# Patient Record
Sex: FEMALE | Race: WHITE | URBAN - NONMETROPOLITAN AREA
[De-identification: names, ages, dates, MRNs, and addresses within clinical notes are randomized per-mention and may not be internally consistent; named-entity substitution may affect disease eponyms.]

---

## 2020-09-24 ENCOUNTER — TELEPHONE (OUTPATIENT)
Dept: OBGYN CLINIC | Age: 55
End: 2020-09-24

## 2020-11-17 ENCOUNTER — HOSPITAL ENCOUNTER (OUTPATIENT)
Age: 55
Setting detail: SPECIMEN
Discharge: HOME | End: 2020-11-17
Payer: COMMERCIAL

## 2020-11-17 ENCOUNTER — OFFICE VISIT (OUTPATIENT)
Dept: OBGYN CLINIC | Age: 55
End: 2020-11-17
Payer: COMMERCIAL

## 2020-11-17 VITALS
SYSTOLIC BLOOD PRESSURE: 133 MMHG | BODY MASS INDEX: 42.35 KG/M2 | WEIGHT: 269.8 LBS | HEIGHT: 67 IN | DIASTOLIC BLOOD PRESSURE: 73 MMHG

## 2020-11-17 PROCEDURE — 99396 PREV VISIT EST AGE 40-64: CPT | Performed by: NURSE PRACTITIONER

## 2020-11-17 RX ORDER — GLIPIZIDE 5 MG/1
5 TABLET ORAL
COMMUNITY

## 2020-11-17 RX ORDER — LOVASTATIN 20 MG/1
TABLET ORAL
COMMUNITY
Start: 2020-09-12

## 2020-11-17 RX ORDER — ENALAPRIL MALEATE 2.5 MG/1
TABLET ORAL
COMMUNITY
Start: 2020-09-12

## 2020-11-17 RX ORDER — ERTUGLIFLOZIN 5 MG/1
TABLET, FILM COATED ORAL
COMMUNITY
Start: 2020-09-14

## 2020-11-17 RX ORDER — FLUCONAZOLE 150 MG/1
150 TABLET ORAL ONCE
Qty: 2 TABLET | Refills: 0 | Status: SHIPPED | OUTPATIENT
Start: 2020-11-17 | End: 2020-11-17

## 2020-11-17 RX ORDER — INSULIN GLARGINE AND LIXISENATIDE 100; 33 U/ML; UG/ML
INJECTION, SOLUTION SUBCUTANEOUS
COMMUNITY
Start: 2020-09-15

## 2020-11-17 SDOH — HEALTH STABILITY: MENTAL HEALTH: HOW OFTEN DO YOU HAVE A DRINK CONTAINING ALCOHOL?: MONTHLY OR LESS

## 2020-11-17 NOTE — PROGRESS NOTES
10/2019    Last HPV: 10/2019    Last Mammogram: 2017    Last Dexascan n/a    Last colorectal screen- never had     Do you do self breast exams: encouraged monthly SBE     Past Medical History:   Diagnosis Date    Abnormal Pap smear of cervix 2016    Diabetes mellitus ([facility] [address]     H/O tinnitus     Hearing loss     High cholesterol        Past Surgical History:   Procedure Laterality Date    TONSILLECTOMY      TUBAL LIGATION      TYMPANOSTOMY TUBE PLACEMENT         Family History   Problem Relation Age of Onset    Heart Attack Maternal Grandfather     Lung Cancer Father     Hypertension Father     Cancer Mother         lymphoma    Stroke Brother        Chief Complaint   Patient presents with    Gynecologic Exam     NL pap 10/2019. Refill Myrbetriq. Voices no concerns. PE:  Vital Signs  Blood pressure 133/73, height 5' 7\" (1.702 m), weight 269 lb 12.8 oz (122.4 kg). Estimated body mass index is 42.26 kg/m² as calculated from the following:    Height as of this encounter: 5' 7\" (1.702 m). Weight as of this encounter: 269 lb 12.8 oz (122.4 kg). HPI: Patient here for annual exam. Waqas Triana well, voices no concerns. Negative pap in 2019 per [facility]. Due for mammogram. Wellness labs UTD. Review of Systems   Genitourinary: Positive for frequency. All other systems reviewed and are negative. Objective  Heent:   negative   Cor: regular rate and rhythm, no murmurs              Pul:clear to auscultation bilaterally- no wheezes, rales or rhonchi, normal air movement, no respiratory distress      GI: Abdomen soft, non-tender.  BS normal. No masses,  No organomegaly           Extremities: normal strength, tone, and muscle mass   Breasts: Breast:normal appearance, no masses or tenderness, No nipple retraction or dimpling, No nipple discharge or bleeding   Pelvic Exam: GENITAL/URINARY:  External Genitalia:  General appearance; erythematous, white discharge between labia, small amount of break

## 2020-11-18 LAB
DIRECT EXAM: NORMAL
Lab: NORMAL
SPECIMEN DESCRIPTION: NORMAL

## 2021-01-13 ENCOUNTER — TELEPHONE (OUTPATIENT)
Dept: OBGYN CLINIC | Age: 56
End: 2021-01-13

## 2021-01-13 NOTE — TELEPHONE ENCOUNTER
Pt called stating she went to refill her Myrbetriq and it was over $400 a month. Pt wondering if there is a coupons for it or if there was something else that was cheaper she could try. Please advise.

## 2021-01-14 RX ORDER — OXYBUTYNIN CHLORIDE 10 MG/1
10 TABLET, EXTENDED RELEASE ORAL DAILY
Qty: 30 TABLET | Refills: 3 | Status: SHIPPED | OUTPATIENT
Start: 2021-01-14 | End: 2021-05-18

## 2021-01-14 NOTE — TELEPHONE ENCOUNTER
Pt called and stated that in the past her script cost $80 a month and is not sure why it has gone up. Pt is willing to try new medication.

## 2021-05-17 ENCOUNTER — TELEPHONE (OUTPATIENT)
Dept: OBGYN CLINIC | Age: 56
End: 2021-05-17

## 2021-05-17 NOTE — TELEPHONE ENCOUNTER
Pt called requesting a refill of oxybutynin. Pt requesting to increase dose to 20 mg sent to [facility].

## 2021-05-18 RX ORDER — OXYBUTYNIN CHLORIDE 15 MG/1
15 TABLET, EXTENDED RELEASE ORAL DAILY
Qty: 30 TABLET | Refills: 6 | Status: SHIPPED | OUTPATIENT
Start: 2021-05-18

## 2021-05-24 RX ORDER — OXYBUTYNIN CHLORIDE 10 MG/1
TABLET, EXTENDED RELEASE ORAL
Qty: 30 TABLET | OUTPATIENT
Start: 2021-05-24

## 2021-11-04 ENCOUNTER — TELEPHONE (OUTPATIENT)
Dept: OBGYN CLINIC | Age: 56
End: 2021-11-04

## 2021-11-04 NOTE — TELEPHONE ENCOUNTER
Patient called stating that she has an appointment coming up later this month and wanted to confirm what was being done. Explained that her pap in 2019 had neg HPV, so should just be breast and pelvic. Patient was at the urology office and mentioned that she was still having urinary incontinence. She was previously on Myrbetriq and it became very expensive, so switched to oxybutynin. She had an accident at work last week where she lost full bladder control and had to go home to change. She was told by the urologist that she needed to follow up with our office since we are the ones prescribing the medication. Patient would like to discuss medication options at her upcoming appointment.

## 2021-11-17 ENCOUNTER — OFFICE VISIT (OUTPATIENT)
Dept: OBGYN CLINIC | Age: 56
End: 2021-11-17
Payer: COMMERCIAL

## 2021-11-17 VITALS — BODY MASS INDEX: 41.18 KG/M2 | DIASTOLIC BLOOD PRESSURE: 78 MMHG | SYSTOLIC BLOOD PRESSURE: 130 MMHG | WEIGHT: 262.9 LBS

## 2021-11-17 DIAGNOSIS — Z01.419 WOMEN'S ANNUAL ROUTINE GYNECOLOGICAL EXAMINATION: Primary | ICD-10-CM

## 2021-11-17 DIAGNOSIS — N32.81 OAB (OVERACTIVE BLADDER): ICD-10-CM

## 2021-11-17 PROCEDURE — 99396 PREV VISIT EST AGE 40-64: CPT | Performed by: NURSE PRACTITIONER

## 2021-11-17 RX ORDER — FLASH GLUCOSE SENSOR
KIT MISCELLANEOUS
COMMUNITY
Start: 2021-11-05

## 2021-11-17 RX ORDER — DARIFENACIN HYDROBROMIDE 15 MG/1
15 TABLET, EXTENDED RELEASE ORAL DAILY
Qty: 30 TABLET | Refills: 3 | Status: SHIPPED | OUTPATIENT
Start: 2021-11-17 | End: 2022-07-14

## 2021-11-17 RX ORDER — ASPIRIN 81 MG/1
81 TABLET, CHEWABLE ORAL DAILY
COMMUNITY

## 2021-11-17 NOTE — PROGRESS NOTES
STEGLATRO 5 MG TABS, , Disp: , Rfl:     enalapril (VASOTEC) 2.5 MG tablet, , Disp: , Rfl:     glipiZIDE (GLUCOTROL) 5 MG tablet, Take 5 mg by mouth 2 times daily (before meals), Disp: , Rfl:     Social History     Substance and Sexual Activity   Sexual Activity Yes    Partners: Male       Any bleeding or pain with intercourse: No    Last Yearly:  11/2020    Last pap: 10/2019    Last HPV: 10/2019    Last Mammogram: n/a    Do you do self breast exams: encouraged monthly     Past Medical History:   Diagnosis Date    Abnormal Pap smear of cervix 2016    Diabetes mellitus ([facility] [address]     H/O tinnitus     Hearing loss     High cholesterol        Past Surgical History:   Procedure Laterality Date    TONSILLECTOMY      TUBAL LIGATION      TYMPANOSTOMY TUBE PLACEMENT         Family History   Problem Relation Age of Onset    Heart Attack Maternal Grandfather     Lung Cancer Father     Hypertension Father     Cancer Mother         lymphoma    Stroke Brother        Chief Complaint   Patient presents with    Gynecologic Exam     Last pap 10/9/19. Reports oxybutynin not helping, reports sx the same- maybe slightly worse. Had 1 accident at work where she lost full bladder control. Mammogram due. PE:  Vital Signs  Blood pressure 130/78, weight 262 lb 14.4 oz (119.3 kg). Estimated body mass index is 41.18 kg/m² as calculated from the following:    Height as of 11/17/20: 5' 7\" (1.702 m). Weight as of this encounter: 262 lb 14.4 oz (119.3 kg). HPI: Patient presents today for annual exam. Feeling well, voices no concerns. Denies breast/pelvic pain. Negative pap 10/2019. Mammogram due. Wellness reviewed; reports hgb a1c well controlled. Denies PMB. Reports Ditropan is not controlling OAB symptoms. States she gets the urge to void and is unable to hold urine if she does not go straight to the bathroom. Review of Systems   Genitourinary: Positive for frequency.  Negative for difficulty urinating, dysuria, pelvic pain, vaginal bleeding and vaginal discharge. Overactive bladder     All other systems reviewed and are negative. Physical Exam  Constitutional:       General: She is not in acute distress. Appearance: Normal appearance. She is not ill-appearing. Genitourinary:      Vulva normal.      No vaginal discharge. Right Adnexa: not tender. Left Adnexa: not tender. Adnexa exam comments: Difficult to assess due to body habitus. Uterus is not tender. Breasts:      Right: No mass, nipple discharge, skin change or tenderness. Left: No mass, nipple discharge, skin change or tenderness. HENT:      Head: Normocephalic. Cardiovascular:      Rate and Rhythm: Normal rate and regular rhythm. Pulmonary:      Effort: Pulmonary effort is normal.      Breath sounds: Normal breath sounds. Abdominal:      Palpations: Abdomen is soft. Tenderness: There is no abdominal tenderness. There is no guarding or rebound. Musculoskeletal:         General: Normal range of motion. Neurological:      General: No focal deficit present. Mental Status: She is alert. Psychiatric:         Mood and Affect: Mood normal.         Behavior: Behavior normal.                         Assessment and Plan          Diagnosis Orders   1. Women's annual routine gynecological examination     2. OAB (overactive bladder)  darifenacin (ENABLEX) 15 MG extended release tablet       Repeat Annual every 1 year  Cervical Cytology Evaluation begins at 24years old. If Negative Cytology, Follow-up screening per current guidelines. Mammograms every 1year. If 35 yo and last mammogram was negative. Routine healthmaintenance per patients PCP. I have discontinued Shy Loo's mirabegron. I am also having her start on darifenacin.  Additionally, I am having her maintain her sertraline, lovastatin, Soliqua, Steglatro, enalapril, glipiZIDE, oxybutynin, FreeStyle [place] 14 Day Sensor, and aspirin. Return in about 1 year (around 11/17/2022) for yearly. She was also counseled on her preventative health maintenance recommendations and follow-up. There are no Patient Instructions on file for this visit.     LILLIAN Hancock NP,11/17/2021 8:46 PM

## 2022-07-14 RX ORDER — DARIFENACIN HYDROBROMIDE 15 MG/1
15 TABLET, EXTENDED RELEASE ORAL DAILY
Qty: 90 TABLET | Refills: 1 | Status: SHIPPED | OUTPATIENT
Start: 2022-07-14 | End: 2023-07-14

## 2022-07-14 NOTE — TELEPHONE ENCOUNTER
[facility] pharmacy called requesting 90 day supply of enablex. (it looks like previous rx was written for 30 day supply) Pt. Is scheduled for an annual exam in November. I pended the medication so you can just sign to send.

## 2023-02-16 ENCOUNTER — HOSPITAL ENCOUNTER (OUTPATIENT)
Age: 58
Setting detail: SPECIMEN
Discharge: HOME | End: 2023-02-16

## 2023-02-16 ENCOUNTER — OFFICE VISIT (OUTPATIENT)
Dept: OBGYN CLINIC | Age: 58
End: 2023-02-16
Payer: COMMERCIAL

## 2023-02-16 VITALS
SYSTOLIC BLOOD PRESSURE: 131 MMHG | WEIGHT: 257.4 LBS | DIASTOLIC BLOOD PRESSURE: 66 MMHG | HEIGHT: 67 IN | BODY MASS INDEX: 40.4 KG/M2

## 2023-02-16 DIAGNOSIS — Z01.419 WOMEN'S ANNUAL ROUTINE GYNECOLOGICAL EXAMINATION: Primary | ICD-10-CM

## 2023-02-16 DIAGNOSIS — N90.89 VULVAR IRRITATION: ICD-10-CM

## 2023-02-16 PROCEDURE — 99396 PREV VISIT EST AGE 40-64: CPT | Performed by: NURSE PRACTITIONER

## 2023-02-16 RX ORDER — TIRZEPATIDE 7.5 MG/.5ML
INJECTION, SOLUTION SUBCUTANEOUS
COMMUNITY
Start: 2023-01-31

## 2023-02-16 RX ORDER — INSULIN LISPRO 100 [IU]/ML
INJECTION, SOLUTION INTRAVENOUS; SUBCUTANEOUS
COMMUNITY
Start: 2022-12-05

## 2023-02-16 RX ORDER — OFLOXACIN 3 MG/ML
SOLUTION/ DROPS OPHTHALMIC
COMMUNITY
Start: 2023-02-14

## 2023-02-16 RX ORDER — INSULIN GLARGINE 300 U/ML
INJECTION, SOLUTION SUBCUTANEOUS
COMMUNITY
Start: 2022-12-07

## 2023-02-16 RX ORDER — CLOBETASOL PROPIONATE 0.5 MG/G
CREAM TOPICAL
Qty: 1 EACH | Refills: 0 | Status: SHIPPED | OUTPATIENT
Start: 2023-02-16

## 2023-02-16 RX ORDER — EMPAGLIFLOZIN 10 MG/1
TABLET, FILM COATED ORAL
COMMUNITY
Start: 2023-02-01

## 2023-02-16 RX ORDER — OXYBUTYNIN CHLORIDE 10 MG/1
TABLET, EXTENDED RELEASE ORAL
COMMUNITY
Start: 2023-01-02

## 2023-02-16 NOTE — PROGRESS NOTES
YEARLY PHYSICAL    Date of service: 2023    Maisha Sepulveda  Is a 61 y.o. female    PT's PCP is: Toribio Phalen, APRN     : 1965                                         Chaperone for Intimate Exam  Chaperone was offered as part of the rooming process. Patient declined and agrees to continue with exam without a chaperone. Chaperone: n/a      Subjective:       No LMP recorded. Patient is postmenopausal.     Are your menses regular: not applicable    OB History    Para Term  AB Living   4 3     1 3   SAB IAB Ectopic Molar Multiple Live Births   1         3      # Outcome Date GA Lbr Matty/2nd Weight Sex Delivery Anes PTL Lv   4 SAB            3 Para            2 Para            1 Para                 Social History     Tobacco Use   Smoking Status Never   Smokeless Tobacco Never        Social History     Substance and Sexual Activity   Alcohol Use Yes       Family History   Problem Relation Age of Onset    Heart Attack Maternal Grandfather     Lung Cancer Father     Hypertension Father     Cancer Mother         lymphoma    Stroke Brother        Any family history of breast or ovarian cancer: No    Any family history of blood clots: No      Allergies: No known allergies      Current Outpatient Medications:     clobetasol (TEMOVATE) 0.05 % cream, Apply topically 2 times daily. , Disp: 1 each, Rfl: 0    MOUNJARO 7.5 MG/0.5ML SOPN SC injection, , Disp: , Rfl:     oxybutynin (DITROPAN-XL) 10 MG extended release tablet, TAKE 1 TABLET BY MOUTH DAILY, Disp: , Rfl:     ofloxacin (OCUFLOX) 0.3 % solution, , Disp: , Rfl:     HUMALOG KWIKPEN 100 UNIT/ML SOPN, , Disp: , Rfl:     TOUJEO MAX SOLOSTAR 300 UNIT/ML SOPN, ADMINISTER UP  UNITS UNDER THE SKIN EVERY DAY, Disp: , Rfl:     JARDIANCE 10 MG tablet, TAKE 1 TABLET BY MOUTH EVERY MORNING, Disp: , Rfl:     Continuous Blood Gluc Sensor (FREESTYLE ANGIE 14 DAY SENSOR) [facility], USE TO CHECK BLOOD SUGARS 6-8 TIMES PER DAY, Disp: , Rfl:     aspirin 81 MG chewable tablet, Take 81 mg by mouth daily, Disp: , Rfl:     oxybutynin (DITROPAN XL) 15 MG extended release tablet, Take 1 tablet by mouth daily, Disp: 30 tablet, Rfl: 6    sertraline (ZOLOFT) 50 MG tablet, , Disp: , Rfl:     lovastatin (MEVACOR) 20 MG tablet, , Disp: , Rfl:     enalapril (VASOTEC) 2.5 MG tablet, , Disp: , Rfl:     Social History     Substance and Sexual Activity   Sexual Activity Yes    Partners: Male       Any bleeding or pain with intercourse: No    Last Yearly:  11/17/21    Last pap: 10/2019    Last HPV: 10/2019    Last Mammogram: 3/25/22    Last Dexascan n/a    Last colorectal screen- n/a    Do you do self breast exams: Yes    Past Medical History:   Diagnosis Date    Abnormal Pap smear of cervix 2016    Diabetes mellitus ([facility] [address]     H/O tinnitus     Hearing loss     High cholesterol        Past Surgical History:   Procedure Laterality Date    TONSILLECTOMY      TUBAL LIGATION      TYMPANOSTOMY TUBE PLACEMENT         Family History   Problem Relation Age of Onset    Heart Attack Maternal Grandfather     Lung Cancer Father     Hypertension Father     Cancer Mother         lymphoma    Stroke Brother        Chief Complaint   Patient presents with    Annual Exam     Last pap 10/2019. Voices no concerns. PE:  Vital Signs  Blood pressure 131/66, height 5' 7\" (1.702 m), weight 257 lb 6.4 oz (116.8 kg). Estimated body mass index is 40.31 kg/m² as calculated from the following:    Height as of this encounter: 5' 7\" (1.702 m). Weight as of this encounter: 257 lb 6.4 oz (116.8 kg). NURSE: mal    HPI: Patient presents today for annual exam. Feeling well, voices no concerns. Denies breast/pelvic pain. Due for pap. Mammogram due in March. Wellness reviewed. Denies PMB. Review of Systems   Constitutional:  Negative for chills, fatigue and fever. Respiratory:  Negative for shortness of breath.     Cardiovascular: Negative for chest pain. Gastrointestinal:  Negative for abdominal pain, constipation and diarrhea. Genitourinary:  Negative for dysuria, enuresis, frequency, menstrual problem, pelvic pain, urgency, vaginal bleeding, vaginal discharge and vaginal pain. Neurological:  Negative for dizziness, light-headedness and headaches. Physical Exam  Constitutional:       General: She is not in acute distress. Appearance: Normal appearance. She is obese. She is not ill-appearing. Genitourinary:      Vulva, bladder and urethral meatus normal.      No lesions in the vagina. Right Labia: No rash or lesions. Left Labia: No lesions or rash. No vaginal discharge. No vaginal prolapse present. No vaginal atrophy present. Right Adnexa: not tender and no mass present. Left Adnexa: not tender and no mass present. No cervical motion tenderness, discharge or friability. No parametrium nodularity present. Uterus is not enlarged or tender. No uterine mass detected. No urethral prolapse, tenderness or mass present. Breasts:     Right: No mass, nipple discharge, skin change or tenderness. Left: No mass, nipple discharge, skin change or tenderness. HENT:      Head: Normocephalic and atraumatic. Eyes:      Extraocular Movements: Extraocular movements intact. Pupils: Pupils are equal, round, and reactive to light. Cardiovascular:      Rate and Rhythm: Normal rate. Pulmonary:      Effort: Pulmonary effort is normal.   Abdominal:      Palpations: Abdomen is soft. Tenderness: There is no abdominal tenderness. There is no guarding or rebound. Musculoskeletal:         General: Normal range of motion. Neurological:      General: No focal deficit present. Mental Status: She is alert and oriented to person, place, and time. Skin:     General: Skin is warm and dry.    Psychiatric:         Mood and Affect: Mood normal.         Behavior: Behavior normal.                           Assessment & Plan  1. Women's annual routine gynecological examination  Repeat Annual every 1 year  Cervical Cytology Evaluation begins at 24years old. If Negative Cytology, Follow-up screening per current guidelines. Mammograms every 1year. If 37 yo and last mammogram was negative. Routine healthmaintenance per patients PCP. - PAP SMEAR; Future    2. Vulvar irritation  Discussed clobetasol BID until recheck   - clobetasol (TEMOVATE) 0.05 % cream; Apply topically 2 times daily. Dispense: 1 each; Refill: 0        Return in about 10 weeks (around 4/27/2023) for recheck vulva.        Electronically Signed by LILLIAN Marroquin NP

## 2023-02-17 LAB
HPV SAMPLE: NORMAL
HPV, GENOTYPE 16: NOT DETECTED
HPV, GENOTYPE 18: NOT DETECTED
HPV, HIGH RISK OTHER: NOT DETECTED
HPV, INTERPRETATION: NORMAL
SPECIMEN DESCRIPTION: NORMAL

## 2023-02-22 ASSESSMENT — ENCOUNTER SYMPTOMS
ABDOMINAL PAIN: 0
DIARRHEA: 0
CONSTIPATION: 0
SHORTNESS OF BREATH: 0

## 2023-02-28 RX ORDER — FLUCONAZOLE 150 MG/1
150 TABLET ORAL ONCE
Qty: 1 TABLET | Refills: 0 | Status: SHIPPED | OUTPATIENT
Start: 2023-02-28 | End: 2023-02-28

## 2023-04-27 ENCOUNTER — OFFICE VISIT (OUTPATIENT)
Dept: OBGYN CLINIC | Age: 58
End: 2023-04-27
Payer: COMMERCIAL

## 2023-04-27 VITALS
HEIGHT: 67 IN | BODY MASS INDEX: 40.24 KG/M2 | WEIGHT: 256.4 LBS | SYSTOLIC BLOOD PRESSURE: 130 MMHG | DIASTOLIC BLOOD PRESSURE: 86 MMHG

## 2023-04-27 DIAGNOSIS — N90.89 VULVAR IRRITATION: Primary | ICD-10-CM

## 2023-04-27 PROCEDURE — 99213 OFFICE O/P EST LOW 20 MIN: CPT | Performed by: NURSE PRACTITIONER

## 2023-05-01 RX ORDER — CONJUGATED ESTROGENS 0.62 MG/G
0.5 CREAM VAGINAL DAILY
Qty: 30 G | Refills: 0 | Status: SHIPPED | OUTPATIENT
Start: 2023-05-01

## 2023-05-02 RX ORDER — ESTRADIOL 0.1 MG/G
0.5 CREAM VAGINAL DAILY
Qty: 1 EACH | Refills: 0 | Status: SHIPPED | OUTPATIENT
Start: 2023-05-02

## 2023-05-02 NOTE — TELEPHONE ENCOUNTER
Patient called back. She did some research on cost and desires for estrace rx to go to [address] please

## 2023-05-02 NOTE — TELEPHONE ENCOUNTER
I called patient and l/m informing her of this. Asked her to call back and confirm if she still wants rx to go to [facility] or would like to [facility]

## 2023-05-03 ASSESSMENT — ENCOUNTER SYMPTOMS: RESPIRATORY NEGATIVE: 1

## 2023-05-03 NOTE — PROGRESS NOTES
PROBLEM VISIT     Date of service: 2023    Caty Hooker  Is a 61 y.o. female    PT's PCP is: LILLIAN Jackson     : 1965                                             Subjective:       No LMP recorded. Patient is postmenopausal.   OB History    Para Term  AB Living   4 3     1 3   SAB IAB Ectopic Molar Multiple Live Births   1         3      # Outcome Date GA Lbr Matty/2nd Weight Sex Delivery Anes PTL Lv   4 SAB            3 Para            2 Para            1 Para                 Social History     Tobacco Use   Smoking Status Never   Smokeless Tobacco Never        Social History     Substance and Sexual Activity   Alcohol Use Yes       Allergies: No known allergies      Current Outpatient Medications:     estrogens conjugated (PREMARIN) 0.625 MG/GM CREA vaginal cream, Place 0.5 g vaginally daily, Disp: 30 g, Rfl: 0    estradiol (ESTRACE VAGINAL) 0.1 MG/GM vaginal cream, Place 0.5 g vaginally daily, Disp: 1 each, Rfl: 0    MOUNJARO 7.5 MG/0.5ML SOPN SC injection, , Disp: , Rfl:     oxybutynin (DITROPAN-XL) 10 MG extended release tablet, TAKE 1 TABLET BY MOUTH DAILY, Disp: , Rfl:     ofloxacin (OCUFLOX) 0.3 % solution, , Disp: , Rfl:     HUMALOG KWIKPEN 100 UNIT/ML SOPN, , Disp: , Rfl:     TOUJEO MAX SOLOSTAR 300 UNIT/ML SOPN, ADMINISTER UP  UNITS UNDER THE SKIN EVERY DAY, Disp: , Rfl:     JARDIANCE 10 MG tablet, TAKE 1 TABLET BY MOUTH EVERY MORNING, Disp: , Rfl:     clobetasol (TEMOVATE) 0.05 % cream, Apply topically 2 times daily. , Disp: 1 each, Rfl: 0    Continuous Blood Gluc Sensor (FREESTYLE ANGIE 14 DAY SENSOR) [facility] [place], USE TO CHECK BLOOD SUGARS 6-8 TIMES PER DAY, Disp: , Rfl:     aspirin 81 MG chewable tablet, Take 81 mg by mouth daily, Disp: , Rfl:     sertraline (ZOLOFT) 50 MG tablet, , Disp: , Rfl:     lovastatin (MEVACOR) 20 MG tablet, , Disp: , Rfl:     enalapril (VASOTEC) 2.5 MG tablet, , Disp: , Rfl:     Social History     Substance and Sexual Activity

## 2023-06-12 ENCOUNTER — HOSPITAL ENCOUNTER (OUTPATIENT)
Age: 58
Setting detail: SPECIMEN
Discharge: HOME | End: 2023-06-12

## 2023-07-09 SDOH — ECONOMIC STABILITY: HOUSING INSECURITY
IN THE LAST 12 MONTHS, WAS THERE A TIME WHEN YOU DID NOT HAVE A STEADY PLACE TO SLEEP OR SLEPT IN A SHELTER (INCLUDING NOW)?: NO

## 2023-07-09 SDOH — ECONOMIC STABILITY: TRANSPORTATION INSECURITY
IN THE PAST 12 MONTHS, HAS LACK OF TRANSPORTATION KEPT YOU FROM MEETINGS, WORK, OR FROM GETTING THINGS NEEDED FOR DAILY LIVING?: NO

## 2023-07-09 SDOH — ECONOMIC STABILITY: FOOD INSECURITY: WITHIN THE PAST 12 MONTHS, THE FOOD YOU BOUGHT JUST DIDN'T LAST AND YOU DIDN'T HAVE MONEY TO GET MORE.: NEVER TRUE

## 2023-07-09 SDOH — ECONOMIC STABILITY: FOOD INSECURITY: WITHIN THE PAST 12 MONTHS, YOU WORRIED THAT YOUR FOOD WOULD RUN OUT BEFORE YOU GOT MONEY TO BUY MORE.: NEVER TRUE

## 2023-07-09 SDOH — ECONOMIC STABILITY: INCOME INSECURITY: HOW HARD IS IT FOR YOU TO PAY FOR THE VERY BASICS LIKE FOOD, HOUSING, MEDICAL CARE, AND HEATING?: NOT HARD AT ALL

## 2023-07-09 ASSESSMENT — PATIENT HEALTH QUESTIONNAIRE - PHQ9
SUM OF ALL RESPONSES TO PHQ QUESTIONS 1-9: 0
2. FEELING DOWN, DEPRESSED OR HOPELESS: NOT AT ALL
1. LITTLE INTEREST OR PLEASURE IN DOING THINGS: 0
SUM OF ALL RESPONSES TO PHQ9 QUESTIONS 1 & 2: 0
1. LITTLE INTEREST OR PLEASURE IN DOING THINGS: NOT AT ALL
2. FEELING DOWN, DEPRESSED OR HOPELESS: 0
SUM OF ALL RESPONSES TO PHQ QUESTIONS 1-9: 0
SUM OF ALL RESPONSES TO PHQ9 QUESTIONS 1 & 2: 0

## 2023-07-12 ENCOUNTER — OFFICE VISIT (OUTPATIENT)
Dept: OBGYN CLINIC | Age: 58
End: 2023-07-12

## 2023-07-12 VITALS — WEIGHT: 257 LBS | BODY MASS INDEX: 40.25 KG/M2 | DIASTOLIC BLOOD PRESSURE: 81 MMHG | SYSTOLIC BLOOD PRESSURE: 124 MMHG

## 2023-07-12 DIAGNOSIS — N90.89 VULVAR IRRITATION: Primary | ICD-10-CM

## 2023-07-12 RX ORDER — TIRZEPATIDE 10 MG/.5ML
INJECTION, SOLUTION SUBCUTANEOUS
COMMUNITY
Start: 2023-07-07

## 2023-07-18 LAB — SURGICAL PATHOLOGY REPORT: NORMAL

## 2023-07-20 ASSESSMENT — ENCOUNTER SYMPTOMS: RESPIRATORY NEGATIVE: 1

## 2024-03-14 ENCOUNTER — OFFICE VISIT (OUTPATIENT)
Dept: OBGYN CLINIC | Age: 59
End: 2024-03-14
Payer: COMMERCIAL

## 2024-03-14 VITALS
WEIGHT: 240.6 LBS | DIASTOLIC BLOOD PRESSURE: 62 MMHG | SYSTOLIC BLOOD PRESSURE: 108 MMHG | BODY MASS INDEX: 37.76 KG/M2 | HEIGHT: 67 IN

## 2024-03-14 DIAGNOSIS — Z01.419 WOMEN'S ANNUAL ROUTINE GYNECOLOGICAL EXAMINATION: Primary | ICD-10-CM

## 2024-03-14 DIAGNOSIS — N90.89 VULVAR IRRITATION: ICD-10-CM

## 2024-03-14 PROCEDURE — 99396 PREV VISIT EST AGE 40-64: CPT | Performed by: NURSE PRACTITIONER

## 2024-03-14 RX ORDER — INSULIN LISPRO 100 [IU]/ML
INJECTION, SOLUTION INTRAVENOUS; SUBCUTANEOUS
COMMUNITY

## 2024-03-14 RX ORDER — CLOBETASOL PROPIONATE 0.5 MG/G
CREAM TOPICAL
Qty: 1 EACH | Refills: 0 | Status: SHIPPED | OUTPATIENT
Start: 2024-03-14

## 2024-03-14 NOTE — PROGRESS NOTES
Abdomen is soft.      Tenderness: There is no abdominal tenderness. There is no guarding or rebound.   Musculoskeletal:         General: Normal range of motion.   Neurological:      General: No focal deficit present.      Mental Status: She is alert and oriented to person, place, and time.   Skin:     General: Skin is warm and dry.   Psychiatric:         Mood and Affect: Mood normal.         Behavior: Behavior normal.                             Assessment & Plan  1. Women's annual routine gynecological examination  Repeat Annual every 1 year  Cervical Cytology Evaluation begins at 21 years old.  If Negative Cytology, Follow-up screening per current guidelines.    Mammograms every 1year. If 39 yo and last mammogram was negative.  Routine healthmaintenance per patients PCP.    2. Vulvar irritation  Using [place] soap again more recently. Encouraged non- scented or sensitive cleaning agents. No soap between labia.   - clobetasol (TEMOVATE) 0.05 % cream; Apply topically 2 times daily.  Dispense: 1 each; Refill: 0        Return in about 1 year (around 3/14/2025) for yearly.     Electronically Signed by LILLIAN Hester NP

## 2025-08-07 ENCOUNTER — OFFICE VISIT (OUTPATIENT)
Dept: URGENT CARE | Facility: CLINIC | Age: 60
End: 2025-08-07
Payer: COMMERCIAL

## 2025-08-07 VITALS
SYSTOLIC BLOOD PRESSURE: 140 MMHG | DIASTOLIC BLOOD PRESSURE: 64 MMHG | OXYGEN SATURATION: 98 % | HEART RATE: 82 BPM | WEIGHT: 232.13 LBS | RESPIRATION RATE: 16 BRPM | TEMPERATURE: 99.3 F

## 2025-08-07 DIAGNOSIS — B34.9 PHARYNGITIS WITH VIRAL SYNDROME: ICD-10-CM

## 2025-08-07 DIAGNOSIS — N39.0 COMPLICATED UTI (URINARY TRACT INFECTION): Primary | ICD-10-CM

## 2025-08-07 DIAGNOSIS — R68.83 CHILLS: ICD-10-CM

## 2025-08-07 DIAGNOSIS — J02.9 PHARYNGITIS WITH VIRAL SYNDROME: ICD-10-CM

## 2025-08-07 LAB
S PYO AG THROAT QL: NEGATIVE
SARS-COV-2 AG UPPER RESP QL IA: NEGATIVE
SL AMB  POCT GLUCOSE, UA: 2000
SL AMB LEUKOCYTE ESTERASE,UA: ABNORMAL
SL AMB POCT BILIRUBIN,UA: ABNORMAL
SL AMB POCT BLOOD,UA: ABNORMAL
SL AMB POCT CLARITY,UA: ABNORMAL
SL AMB POCT COLOR,UA: YELLOW
SL AMB POCT KETONES,UA: 160
SL AMB POCT NITRITE,UA: POSITIVE
SL AMB POCT PH,UA: 6
SL AMB POCT SPECIFIC GRAVITY,UA: 1.02
SL AMB POCT URINE PROTEIN: 30
SL AMB POCT UROBILINOGEN: 0.2
VALID CONTROL: NORMAL

## 2025-08-07 PROCEDURE — 87811 SARS-COV-2 COVID19 W/OPTIC: CPT

## 2025-08-07 PROCEDURE — S9083 URGENT CARE CENTER GLOBAL: HCPCS

## 2025-08-07 PROCEDURE — 87070 CULTURE OTHR SPECIMN AEROBIC: CPT

## 2025-08-07 PROCEDURE — G0382 LEV 3 HOSP TYPE B ED VISIT: HCPCS

## 2025-08-07 PROCEDURE — 81002 URINALYSIS NONAUTO W/O SCOPE: CPT

## 2025-08-07 PROCEDURE — 87147 CULTURE TYPE IMMUNOLOGIC: CPT | Performed by: PHYSICIAN ASSISTANT

## 2025-08-07 RX ORDER — ASPIRIN 81 MG/1
81 TABLET, CHEWABLE ORAL DAILY
COMMUNITY

## 2025-08-07 RX ORDER — ENALAPRIL MALEATE 2.5 MG/1
TABLET ORAL
COMMUNITY
Start: 2025-07-31

## 2025-08-07 RX ORDER — EMPAGLIFLOZIN 10 MG/1
TABLET, FILM COATED ORAL
COMMUNITY
Start: 2025-06-18

## 2025-08-07 RX ORDER — TIRZEPATIDE 2.5 MG/.5ML
INJECTION, SOLUTION SUBCUTANEOUS
COMMUNITY

## 2025-08-07 RX ORDER — INSULIN LISPRO 100 [IU]/ML
INJECTION, SOLUTION INTRAVENOUS; SUBCUTANEOUS
COMMUNITY

## 2025-08-07 RX ORDER — HYDROCHLOROTHIAZIDE 12.5 MG/1
CAPSULE ORAL
COMMUNITY
Start: 2025-07-07

## 2025-08-07 RX ORDER — CIPROFLOXACIN 500 MG/1
500 TABLET, FILM COATED ORAL EVERY 12 HOURS SCHEDULED
Qty: 10 TABLET | Refills: 0 | Status: SHIPPED | OUTPATIENT
Start: 2025-08-07 | End: 2025-08-12

## 2025-08-07 RX ORDER — MAGNESIUM 200 MG
1 TABLET ORAL DAILY
COMMUNITY
Start: 2025-06-02

## 2025-08-09 LAB — BACTERIA THROAT CULT: ABNORMAL
